# Patient Record
Sex: FEMALE | Race: WHITE | HISPANIC OR LATINO | URBAN - METROPOLITAN AREA
[De-identification: names, ages, dates, MRNs, and addresses within clinical notes are randomized per-mention and may not be internally consistent; named-entity substitution may affect disease eponyms.]

---

## 2021-02-04 ENCOUNTER — OFFICE VISIT (OUTPATIENT)
Dept: URBAN - METROPOLITAN AREA CLINIC 60 | Facility: CLINIC | Age: 41
End: 2021-02-04

## 2021-02-09 ENCOUNTER — OFFICE VISIT (OUTPATIENT)
Dept: URBAN - METROPOLITAN AREA SURGERY CENTER 4 | Facility: SURGERY CENTER | Age: 41
End: 2021-02-09

## 2021-02-17 ENCOUNTER — OFFICE VISIT (OUTPATIENT)
Dept: URBAN - METROPOLITAN AREA CLINIC 60 | Facility: CLINIC | Age: 41
End: 2021-02-17

## 2022-07-09 ENCOUNTER — TELEPHONE ENCOUNTER (OUTPATIENT)
Dept: URBAN - METROPOLITAN AREA CLINIC 121 | Facility: CLINIC | Age: 42
End: 2022-07-09

## 2022-07-09 RX ORDER — OMEGA-3/DHA/EPA/FISH OIL 1000 MG
CAPSULE ORAL
Refills: 0 | OUTPATIENT
Start: 2013-05-09 | End: 2015-10-19

## 2022-07-09 RX ORDER — CIPROFLOXACIN HYDROCHLORIDE 500 MG/1
1 TAB BID TABLET, FILM COATED ORAL
Refills: 0 | OUTPATIENT
Start: 2015-11-19 | End: 2016-02-11

## 2022-07-09 RX ORDER — GABAPENTIN 600 MG/1
3MG AM 600MG PM TABLET ORAL
Refills: 0 | OUTPATIENT
Start: 2016-02-11 | End: 2016-04-18

## 2022-07-09 RX ORDER — NEOMYCIN SULFATE, POLYMYXIN B SULFATE AND HYDROCORTISONE 3.5; 10000; 1 MG/ML; [IU]/ML; MG/ML
SOLUTION AURICULAR (OTIC)
Refills: 0 | OUTPATIENT
Start: 2015-11-11 | End: 2016-02-11

## 2022-07-09 RX ORDER — SUCRALFATE 1 G/1
TABLET ORAL
Refills: 0 | OUTPATIENT
Start: 2018-02-27 | End: 2021-02-04

## 2022-07-09 RX ORDER — CHOLESTYRAMINE 4 G/9G
MIX 1 PACKET IN 4-6 OUNCES OF FLUID BID WITH MEAL POWDER, FOR SUSPENSION ORAL TWICE A DAY
Refills: 3 | OUTPATIENT
Start: 2015-10-30 | End: 2015-11-19

## 2022-07-09 RX ORDER — SUCRALFATE 1 G/1
TABLET ORAL
Refills: 0 | OUTPATIENT
Start: 2018-02-15 | End: 2018-02-27

## 2022-07-10 ENCOUNTER — TELEPHONE ENCOUNTER (OUTPATIENT)
Dept: URBAN - METROPOLITAN AREA CLINIC 121 | Facility: CLINIC | Age: 42
End: 2022-07-10

## 2022-07-10 RX ORDER — FAMOTIDINE 10 MG/1
TABLET, FILM COATED ORAL TWICE A DAY
Refills: 0 | Status: ACTIVE | COMMUNITY
Start: 2021-02-04

## 2022-07-10 RX ORDER — SUCRALFATE 1 G/1
TABLET ORAL
Refills: 0 | Status: ACTIVE | COMMUNITY
Start: 2021-02-04

## 2022-07-10 RX ORDER — FAMOTIDINE 40 MG/1
TABLET, FILM COATED ORAL ONCE A DAY
Refills: 1 | Status: ACTIVE | COMMUNITY
Start: 2019-11-11

## 2022-07-10 RX ORDER — SUCRALFATE 1 G/1
FOUR TIMES A DAY TABLET ORAL
Refills: 6 | Status: ACTIVE | COMMUNITY
Start: 2018-03-22

## 2022-07-30 ENCOUNTER — TELEPHONE ENCOUNTER (OUTPATIENT)
Age: 42
End: 2022-07-30

## 2022-07-31 ENCOUNTER — TELEPHONE ENCOUNTER (OUTPATIENT)
Age: 42
End: 2022-07-31

## 2024-05-03 ENCOUNTER — OFFICE VISIT (OUTPATIENT)
Dept: URBAN - METROPOLITAN AREA CLINIC 60 | Facility: CLINIC | Age: 44
End: 2024-05-03

## 2024-06-13 ENCOUNTER — OFFICE VISIT (OUTPATIENT)
Dept: URBAN - METROPOLITAN AREA CLINIC 60 | Facility: CLINIC | Age: 44
End: 2024-06-13

## 2024-06-26 ENCOUNTER — DASHBOARD ENCOUNTERS (OUTPATIENT)
Age: 44
End: 2024-06-26

## 2024-06-26 ENCOUNTER — OFFICE VISIT (OUTPATIENT)
Dept: URBAN - METROPOLITAN AREA CLINIC 60 | Facility: CLINIC | Age: 44
End: 2024-06-26
Payer: COMMERCIAL

## 2024-06-26 VITALS
SYSTOLIC BLOOD PRESSURE: 114 MMHG | TEMPERATURE: 97.8 F | HEART RATE: 78 BPM | HEIGHT: 63 IN | DIASTOLIC BLOOD PRESSURE: 84 MMHG | WEIGHT: 171 LBS | BODY MASS INDEX: 30.3 KG/M2

## 2024-06-26 DIAGNOSIS — Z12.11 COLON CANCER SCREENING: ICD-10-CM

## 2024-06-26 DIAGNOSIS — Z86.010 HISTORY OF COLON POLYPS: ICD-10-CM

## 2024-06-26 DIAGNOSIS — Z98.890 HISTORY OF NISSEN FUNDOPLICATION: ICD-10-CM

## 2024-06-26 DIAGNOSIS — K21.9 GASTROESOPHAGEAL REFLUX DISEASE WITHOUT ESOPHAGITIS: ICD-10-CM

## 2024-06-26 PROCEDURE — 99204 OFFICE O/P NEW MOD 45 MIN: CPT

## 2024-06-26 RX ORDER — KAVA 425 MG
AS DIRECTED CAPSULE ORAL
Status: ACTIVE | COMMUNITY

## 2024-06-26 RX ORDER — EDARAVONE 105 MG/5ML
KIT ORAL
Qty: 50 MILLILITER | Refills: 0 | Status: ACTIVE | COMMUNITY

## 2024-06-26 RX ORDER — DEXTROMETHORPHAN HYDROBROMIDE AND QUINIDINE SULFATE 20; 10 MG/1; MG/1
1 CAPSULE CAPSULE, GELATIN COATED ORAL TWICE A DAY
Status: ACTIVE | COMMUNITY

## 2024-06-26 RX ORDER — FAMOTIDINE 40 MG/1
TAKE ONE TABLET BY MOUTH 30 MINUTES BEFORE FOOD OR DRINK IN THE MORNING AND ONE TABLET AT BEDTIME TABLET, FILM COATED ORAL AS DIRECTED
Qty: 60 | Refills: 5 | OUTPATIENT
Start: 2024-06-26

## 2024-06-26 RX ORDER — RILUZOLE 50 MG/1
1 TABLET 1 HOUR BEFORE OR 2 HOURS AFTER A MEAL TABLET, FILM COATED ORAL TWICE A DAY
Status: ACTIVE | COMMUNITY

## 2024-06-26 RX ORDER — FAMOTIDINE 40 MG/1
AS DIRECTED TABLET, FILM COATED ORAL
Status: ACTIVE | COMMUNITY

## 2024-06-26 NOTE — HPI-PREVIOUS PROCEDURES
Colonoscopy 2/20/2018 with one 4 mm polyp in the transverse colon and internal hemorrhoids. -- EGD 3/6/2018 with normal esophagus, gastritis, previous surgical anastomosis found in the cardia with visible sutures and normal duodenum. -- EGD 6/5/2016 with normal esophagus, mucosa of stomach and fundus appeared to be devitalized with some hemorrhages as well as very dark discoloration and boggy edema after pocket of stomach was completely suctioned out and further exploration it appeared that it could exit from body by retroflexing the tip of the scope and hooking it back into where the lumen appears to swell to a close very near the level of the diaphragm it appears that around 40% of the stomach is herniated up through the diaphragm and normal duodenum. -- Colonoscopy 2/9/2021 with internal hemorrhoids and otherwise normal colon repeat in 5 years.

## 2024-06-26 NOTE — HPI-TODAY'S VISIT:
Patient is a 44 year-old female with past medical history of colon polyps, hemorrhoids, GERD and laparoscopic Nissen. Sx of reflux started a few months ago happening daily. Doing gaviscon and famotidine BID which previously worked but now not helping as much. Recent diagnosis of ALS and patient advised to take NSAIDs by her doctor for inflammation. She is taking 1-2 aleve per day for the last month. Bowel movements regular. No other complaints. Mother with history of CRC. Patient given 5 year recall in 2021 but would like to get an updated scope due to FDR with hx of CRC.  Denies any use of blood thinners, pacemaker or defib. No cardiac or pulmonary issues.   Denies any brbpr, melena, abdominal pain, unintentional weight loss, early satiety, fever, chills, diarrhea, constipation, dysphagia, odynophagia.

## 2024-07-03 ENCOUNTER — LAB OUTSIDE AN ENCOUNTER (OUTPATIENT)
Dept: URBAN - METROPOLITAN AREA CLINIC 60 | Facility: CLINIC | Age: 44
End: 2024-07-03

## 2024-09-23 ENCOUNTER — CLAIMS CREATED FROM THE CLAIM WINDOW (OUTPATIENT)
Dept: URBAN - METROPOLITAN AREA SURGERY CENTER 4 | Facility: SURGERY CENTER | Age: 44
End: 2024-09-23
Payer: COMMERCIAL

## 2024-09-23 ENCOUNTER — CLAIMS CREATED FROM THE CLAIM WINDOW (OUTPATIENT)
Dept: URBAN - METROPOLITAN AREA CLINIC 4 | Facility: CLINIC | Age: 44
End: 2024-09-23
Payer: COMMERCIAL

## 2024-09-23 DIAGNOSIS — K31.89 OTHER DISEASES OF STOMACH AND DUODENUM: ICD-10-CM

## 2024-09-23 DIAGNOSIS — K20.80 ESOPHAGITIS, LOS ANGELES GRADE A: ICD-10-CM

## 2024-09-23 DIAGNOSIS — Z12.11 COLON CANCER SCREENING: ICD-10-CM

## 2024-09-23 DIAGNOSIS — K57.30 DIVERTICULOSIS OF LARGE INTESTINE WITHOUT PERFORATION OR ABSCESS WITHOUT BLEEDING: ICD-10-CM

## 2024-09-23 DIAGNOSIS — K29.70 GASTRITIS, UNSPECIFIED, WITHOUT BLEEDING: ICD-10-CM

## 2024-09-23 DIAGNOSIS — K64.1 SECOND DEGREE HEMORRHOIDS: ICD-10-CM

## 2024-09-23 DIAGNOSIS — K29.70 GASTRITIS: ICD-10-CM

## 2024-09-23 DIAGNOSIS — Z86.010 PERSONAL HISTORY OF COLONIC POLYPS: ICD-10-CM

## 2024-09-23 DIAGNOSIS — Z80.0 FAMILY HISTORY OF MALIGNANT NEOPLASM OF DIGESTIVE ORGANS: ICD-10-CM

## 2024-09-23 DIAGNOSIS — K25.9 GASTRIC ULCER WITHOUT HEMORRHAGE OR PERFORATION, UNSPECIFIED CHRONICITY: ICD-10-CM

## 2024-09-23 DIAGNOSIS — K20.90 ESOPHAGITIS, UNSPECIFIED WITHOUT BLEEDING: ICD-10-CM

## 2024-09-23 DIAGNOSIS — Z86.010 ADENOMAS PERSONAL HISTORY OF COLONIC POLYPS: ICD-10-CM

## 2024-09-23 DIAGNOSIS — K21.9 GASTRO-ESOPHAGEAL REFLUX DISEASE WITHOUT ESOPHAGITIS: ICD-10-CM

## 2024-09-23 PROCEDURE — 88313 SPECIAL STAINS GROUP 2: CPT | Performed by: PATHOLOGY

## 2024-09-23 PROCEDURE — 00813 ANES UPR LWR GI NDSC PX: CPT | Performed by: NURSE ANESTHETIST, CERTIFIED REGISTERED

## 2024-09-23 PROCEDURE — 45378 DIAGNOSTIC COLONOSCOPY: CPT | Performed by: INTERNAL MEDICINE

## 2024-09-23 PROCEDURE — 0529F INTRVL 3/>YR PTS CLNSCP DOCD: CPT | Performed by: INTERNAL MEDICINE

## 2024-09-23 PROCEDURE — 88312 SPECIAL STAINS GROUP 1: CPT | Performed by: PATHOLOGY

## 2024-09-23 PROCEDURE — 43239 EGD BIOPSY SINGLE/MULTIPLE: CPT | Performed by: INTERNAL MEDICINE

## 2024-09-23 PROCEDURE — 88305 TISSUE EXAM BY PATHOLOGIST: CPT | Performed by: PATHOLOGY

## 2024-09-23 RX ORDER — KAVA 425 MG
AS DIRECTED CAPSULE ORAL
Status: ACTIVE | COMMUNITY

## 2024-09-23 RX ORDER — RILUZOLE 50 MG/1
1 TABLET 1 HOUR BEFORE OR 2 HOURS AFTER A MEAL TABLET, FILM COATED ORAL TWICE A DAY
Status: ACTIVE | COMMUNITY

## 2024-09-23 RX ORDER — EDARAVONE 105 MG/5ML
KIT ORAL
Qty: 50 MILLILITER | Refills: 0 | Status: ACTIVE | COMMUNITY

## 2024-09-23 RX ORDER — DEXTROMETHORPHAN HYDROBROMIDE AND QUINIDINE SULFATE 20; 10 MG/1; MG/1
1 CAPSULE CAPSULE, GELATIN COATED ORAL TWICE A DAY
Status: ACTIVE | COMMUNITY

## 2024-09-23 RX ORDER — FAMOTIDINE 40 MG/1
TAKE ONE TABLET BY MOUTH 30 MINUTES BEFORE FOOD OR DRINK IN THE MORNING AND ONE TABLET AT BEDTIME TABLET, FILM COATED ORAL AS DIRECTED
Qty: 60 | Refills: 5 | Status: ACTIVE | COMMUNITY
Start: 2024-06-26

## 2024-09-23 RX ORDER — FAMOTIDINE 40 MG/1
AS DIRECTED TABLET, FILM COATED ORAL
Status: ACTIVE | COMMUNITY

## 2024-10-07 ENCOUNTER — OFFICE VISIT (OUTPATIENT)
Dept: URBAN - METROPOLITAN AREA CLINIC 60 | Facility: CLINIC | Age: 44
End: 2024-10-07